# Patient Record
Sex: MALE | Race: WHITE | NOT HISPANIC OR LATINO | Employment: FULL TIME | URBAN - METROPOLITAN AREA
[De-identification: names, ages, dates, MRNs, and addresses within clinical notes are randomized per-mention and may not be internally consistent; named-entity substitution may affect disease eponyms.]

---

## 2023-08-30 ENCOUNTER — PATIENT ROUNDING (BHMG ONLY) (OUTPATIENT)
Dept: FAMILY MEDICINE CLINIC | Facility: CLINIC | Age: 48
End: 2023-08-30
Payer: COMMERCIAL

## 2023-08-30 ENCOUNTER — OFFICE VISIT (OUTPATIENT)
Dept: FAMILY MEDICINE CLINIC | Facility: CLINIC | Age: 48
End: 2023-08-30
Payer: COMMERCIAL

## 2023-08-30 VITALS
HEIGHT: 73 IN | SYSTOLIC BLOOD PRESSURE: 190 MMHG | OXYGEN SATURATION: 97 % | BODY MASS INDEX: 20.2 KG/M2 | DIASTOLIC BLOOD PRESSURE: 120 MMHG | TEMPERATURE: 97.3 F | WEIGHT: 152.4 LBS | HEART RATE: 69 BPM

## 2023-08-30 DIAGNOSIS — Z13.220 LIPID SCREENING: ICD-10-CM

## 2023-08-30 DIAGNOSIS — I10 PRIMARY HYPERTENSION: ICD-10-CM

## 2023-08-30 DIAGNOSIS — R00.0 TACHYCARDIA: ICD-10-CM

## 2023-08-30 DIAGNOSIS — Z83.3 FAMILY HISTORY OF DIABETES MELLITUS: ICD-10-CM

## 2023-08-30 DIAGNOSIS — K64.9 HEMORRHOIDS, UNSPECIFIED HEMORRHOID TYPE: ICD-10-CM

## 2023-08-30 DIAGNOSIS — K21.9 GASTROESOPHAGEAL REFLUX DISEASE WITHOUT ESOPHAGITIS: ICD-10-CM

## 2023-08-30 DIAGNOSIS — Z72.0 TOBACCO ABUSE: ICD-10-CM

## 2023-08-30 DIAGNOSIS — Z00.00 PREVENTATIVE HEALTH CARE: Primary | ICD-10-CM

## 2023-08-30 DIAGNOSIS — Z12.5 SCREENING PSA (PROSTATE SPECIFIC ANTIGEN): ICD-10-CM

## 2023-08-30 PROBLEM — K64.8 OTHER HEMORRHOIDS: Status: ACTIVE | Noted: 2023-08-30

## 2023-08-30 RX ORDER — ALBUTEROL SULFATE 90 UG/1
2 AEROSOL, METERED RESPIRATORY (INHALATION) EVERY 4 HOURS PRN
Qty: 18 G | Refills: 6 | Status: SHIPPED | OUTPATIENT
Start: 2023-08-30

## 2023-08-30 RX ORDER — PANTOPRAZOLE SODIUM 20 MG/1
20 TABLET, DELAYED RELEASE ORAL DAILY
Qty: 90 TABLET | Refills: 1 | Status: SHIPPED | OUTPATIENT
Start: 2023-08-30

## 2023-08-30 RX ORDER — ATENOLOL 50 MG/1
50 TABLET ORAL DAILY
Qty: 60 TABLET | Refills: 0 | Status: SHIPPED | OUTPATIENT
Start: 2023-08-30

## 2023-08-30 RX ORDER — DOCUSATE SODIUM 100 MG/1
100 CAPSULE, LIQUID FILLED ORAL 2 TIMES DAILY
Qty: 180 CAPSULE | Refills: 2 | Status: SHIPPED | OUTPATIENT
Start: 2023-08-30

## 2023-09-01 LAB
ALBUMIN SERPL-MCNC: 4.5 G/DL (ref 4.1–5.1)
ALBUMIN/GLOB SERPL: 2.3 {RATIO} (ref 1.2–2.2)
ALP SERPL-CCNC: 81 IU/L (ref 44–121)
ALT SERPL-CCNC: 12 IU/L (ref 0–44)
AST SERPL-CCNC: 19 IU/L (ref 0–40)
BASOPHILS # BLD AUTO: 0.1 X10E3/UL (ref 0–0.2)
BASOPHILS NFR BLD AUTO: 1 %
BILIRUB SERPL-MCNC: 0.7 MG/DL (ref 0–1.2)
BUN SERPL-MCNC: 11 MG/DL (ref 6–24)
BUN/CREAT SERPL: 11 (ref 9–20)
CALCIUM SERPL-MCNC: 9.7 MG/DL (ref 8.7–10.2)
CHLORIDE SERPL-SCNC: 101 MMOL/L (ref 96–106)
CHOLEST SERPL-MCNC: 141 MG/DL (ref 100–199)
CO2 SERPL-SCNC: 25 MMOL/L (ref 20–29)
CREAT SERPL-MCNC: 0.99 MG/DL (ref 0.76–1.27)
EGFRCR SERPLBLD CKD-EPI 2021: 93 ML/MIN/1.73
EOSINOPHIL # BLD AUTO: 0 X10E3/UL (ref 0–0.4)
EOSINOPHIL NFR BLD AUTO: 0 %
ERYTHROCYTE [DISTWIDTH] IN BLOOD BY AUTOMATED COUNT: 12.1 % (ref 11.6–15.4)
GLOBULIN SER CALC-MCNC: 2 G/DL (ref 1.5–4.5)
GLUCOSE SERPL-MCNC: 96 MG/DL (ref 70–99)
HBA1C MFR BLD: 5.3 % (ref 4.8–5.6)
HCT VFR BLD AUTO: 47.4 % (ref 37.5–51)
HDLC SERPL-MCNC: 60 MG/DL
HGB BLD-MCNC: 16.5 G/DL (ref 13–17.7)
IMM GRANULOCYTES # BLD AUTO: 0.1 X10E3/UL (ref 0–0.1)
IMM GRANULOCYTES NFR BLD AUTO: 1 %
LDLC SERPL CALC-MCNC: 68 MG/DL (ref 0–99)
LDLC/HDLC SERPL: 1.1 RATIO (ref 0–3.6)
LYMPHOCYTES # BLD AUTO: 1.6 X10E3/UL (ref 0.7–3.1)
LYMPHOCYTES NFR BLD AUTO: 13 %
MCH RBC QN AUTO: 32.3 PG (ref 26.6–33)
MCHC RBC AUTO-ENTMCNC: 34.8 G/DL (ref 31.5–35.7)
MCV RBC AUTO: 93 FL (ref 79–97)
MONOCYTES # BLD AUTO: 0.7 X10E3/UL (ref 0.1–0.9)
MONOCYTES NFR BLD AUTO: 6 %
NEUTROPHILS # BLD AUTO: 9.6 X10E3/UL (ref 1.4–7)
NEUTROPHILS NFR BLD AUTO: 79 %
PLATELET # BLD AUTO: 289 X10E3/UL (ref 150–450)
POTASSIUM SERPL-SCNC: 4.8 MMOL/L (ref 3.5–5.2)
PROT SERPL-MCNC: 6.5 G/DL (ref 6–8.5)
PSA SERPL-MCNC: 1.1 NG/ML (ref 0–4)
RBC # BLD AUTO: 5.11 X10E6/UL (ref 4.14–5.8)
SODIUM SERPL-SCNC: 142 MMOL/L (ref 134–144)
T3 SERPL-MCNC: 129 NG/DL (ref 71–180)
T4 FREE SERPL-MCNC: 1.28 NG/DL (ref 0.82–1.77)
TRIGL SERPL-MCNC: 60 MG/DL (ref 0–149)
TSH SERPL DL<=0.005 MIU/L-ACNC: 2.5 UIU/ML (ref 0.45–4.5)
VLDLC SERPL CALC-MCNC: 13 MG/DL (ref 5–40)
WBC # BLD AUTO: 12.1 X10E3/UL (ref 3.4–10.8)

## 2023-09-06 ENCOUNTER — TELEPHONE (OUTPATIENT)
Dept: FAMILY MEDICINE CLINIC | Facility: CLINIC | Age: 48
End: 2023-09-06
Payer: COMMERCIAL

## 2023-09-08 ENCOUNTER — OFFICE VISIT (OUTPATIENT)
Dept: SURGERY | Facility: CLINIC | Age: 48
End: 2023-09-08
Payer: COMMERCIAL

## 2023-09-08 VITALS
RESPIRATION RATE: 18 BRPM | HEART RATE: 58 BPM | HEIGHT: 73 IN | OXYGEN SATURATION: 99 % | SYSTOLIC BLOOD PRESSURE: 149 MMHG | DIASTOLIC BLOOD PRESSURE: 93 MMHG | WEIGHT: 156.4 LBS | TEMPERATURE: 97.8 F | BODY MASS INDEX: 20.73 KG/M2

## 2023-09-08 DIAGNOSIS — Z12.11 SCREENING FOR MALIGNANT NEOPLASM OF COLON: ICD-10-CM

## 2023-09-08 DIAGNOSIS — K64.1 GRADE II HEMORRHOIDS: Primary | ICD-10-CM

## 2023-09-08 PROCEDURE — 99203 OFFICE O/P NEW LOW 30 MIN: CPT | Performed by: SURGERY

## 2023-09-08 RX ORDER — HYDROCORTISONE ACETATE 25 MG/1
25 SUPPOSITORY RECTAL EVERY 12 HOURS
Qty: 14 SUPPOSITORY | Refills: 0 | Status: SHIPPED | OUTPATIENT
Start: 2023-09-08 | End: 2023-09-15

## 2023-10-30 ENCOUNTER — OFFICE VISIT (OUTPATIENT)
Dept: FAMILY MEDICINE CLINIC | Facility: CLINIC | Age: 48
End: 2023-10-30
Payer: COMMERCIAL

## 2023-10-30 VITALS
BODY MASS INDEX: 20.94 KG/M2 | TEMPERATURE: 97.3 F | OXYGEN SATURATION: 96 % | WEIGHT: 158 LBS | SYSTOLIC BLOOD PRESSURE: 122 MMHG | HEART RATE: 74 BPM | HEIGHT: 73 IN | DIASTOLIC BLOOD PRESSURE: 70 MMHG

## 2023-10-30 DIAGNOSIS — I10 PRIMARY HYPERTENSION: Primary | ICD-10-CM

## 2023-10-30 DIAGNOSIS — Z72.0 TOBACCO ABUSE: ICD-10-CM

## 2023-10-30 DIAGNOSIS — G47.09 OTHER INSOMNIA: ICD-10-CM

## 2023-10-30 DIAGNOSIS — F15.10 CAFFEINE ABUSE: ICD-10-CM

## 2023-10-30 DIAGNOSIS — R63.6 PATIENT UNDERWEIGHT: ICD-10-CM

## 2023-10-30 DIAGNOSIS — K64.8 OTHER HEMORRHOIDS: ICD-10-CM

## 2023-10-30 DIAGNOSIS — K21.9 GASTROESOPHAGEAL REFLUX DISEASE WITHOUT ESOPHAGITIS: ICD-10-CM

## 2023-10-30 DIAGNOSIS — R07.89 OTHER CHEST PAIN: ICD-10-CM

## 2023-10-30 RX ORDER — TRIAMCINOLONE ACETONIDE 55 UG/1
2 SPRAY, METERED NASAL DAILY
Qty: 16.9 ML | Refills: 6 | Status: SHIPPED | OUTPATIENT
Start: 2023-10-30

## 2023-10-30 RX ORDER — PANTOPRAZOLE SODIUM 20 MG/1
20 TABLET, DELAYED RELEASE ORAL DAILY
Qty: 90 TABLET | Refills: 1 | Status: SHIPPED | OUTPATIENT
Start: 2023-10-30

## 2023-10-30 RX ORDER — ALBUTEROL SULFATE 90 UG/1
2 AEROSOL, METERED RESPIRATORY (INHALATION) EVERY 4 HOURS PRN
Qty: 18 G | Refills: 6 | Status: SHIPPED | OUTPATIENT
Start: 2023-10-30

## 2023-10-30 RX ORDER — HYDROXYZINE 50 MG/1
50 TABLET, FILM COATED ORAL 3 TIMES DAILY PRN
Qty: 14 TABLET | Refills: 0 | Status: SHIPPED | OUTPATIENT
Start: 2023-10-30

## 2023-10-30 RX ORDER — CETIRIZINE HYDROCHLORIDE 10 MG/1
10 TABLET ORAL DAILY
Qty: 30 TABLET | Refills: 2 | Status: SHIPPED | OUTPATIENT
Start: 2023-10-30

## 2023-10-30 RX ORDER — ATENOLOL 50 MG/1
50 TABLET ORAL DAILY
Qty: 180 TABLET | Refills: 1 | Status: SHIPPED | OUTPATIENT
Start: 2023-10-30

## 2023-11-27 ENCOUNTER — OFFICE VISIT (OUTPATIENT)
Dept: CARDIOLOGY | Facility: CLINIC | Age: 48
End: 2023-11-27
Payer: COMMERCIAL

## 2023-11-27 VITALS
HEART RATE: 59 BPM | OXYGEN SATURATION: 97 % | SYSTOLIC BLOOD PRESSURE: 126 MMHG | HEIGHT: 73 IN | WEIGHT: 161 LBS | DIASTOLIC BLOOD PRESSURE: 70 MMHG | BODY MASS INDEX: 21.34 KG/M2 | RESPIRATION RATE: 18 BRPM

## 2023-11-27 DIAGNOSIS — I10 PRIMARY HYPERTENSION: ICD-10-CM

## 2023-11-27 DIAGNOSIS — K21.9 GASTROESOPHAGEAL REFLUX DISEASE WITHOUT ESOPHAGITIS: ICD-10-CM

## 2023-11-27 DIAGNOSIS — R06.02 SHORTNESS OF BREATH: ICD-10-CM

## 2023-11-27 DIAGNOSIS — R07.2 PRECORDIAL CHEST PAIN: Primary | ICD-10-CM

## 2023-11-27 PROCEDURE — 99204 OFFICE O/P NEW MOD 45 MIN: CPT | Performed by: INTERNAL MEDICINE

## 2023-11-27 RX ORDER — ASPIRIN 81 MG/1
81 TABLET ORAL DAILY
Qty: 100 TABLET | Refills: 1 | Status: SHIPPED | OUTPATIENT
Start: 2023-11-27

## 2023-11-27 RX ORDER — LOSARTAN POTASSIUM 25 MG/1
25 TABLET ORAL DAILY
Qty: 90 TABLET | Refills: 1 | Status: SHIPPED | OUTPATIENT
Start: 2023-11-27

## 2023-11-27 RX ORDER — HYDROXYZINE 50 MG/1
50 TABLET, FILM COATED ORAL 3 TIMES DAILY PRN
Qty: 90 TABLET | Refills: 1 | Status: SHIPPED | OUTPATIENT
Start: 2023-11-27

## 2023-11-29 ENCOUNTER — TELEPHONE (OUTPATIENT)
Dept: CARDIOLOGY | Facility: CLINIC | Age: 48
End: 2023-11-29
Payer: COMMERCIAL

## 2024-01-08 ENCOUNTER — OFFICE VISIT (OUTPATIENT)
Dept: FAMILY MEDICINE CLINIC | Facility: CLINIC | Age: 49
End: 2024-01-08
Payer: COMMERCIAL

## 2024-01-08 VITALS
SYSTOLIC BLOOD PRESSURE: 156 MMHG | TEMPERATURE: 97.5 F | OXYGEN SATURATION: 95 % | HEART RATE: 83 BPM | HEIGHT: 73 IN | BODY MASS INDEX: 20.81 KG/M2 | DIASTOLIC BLOOD PRESSURE: 98 MMHG | WEIGHT: 157 LBS

## 2024-01-08 DIAGNOSIS — Z00.00 PREVENTATIVE HEALTH CARE: ICD-10-CM

## 2024-01-08 DIAGNOSIS — M77.8 TENDINITIS OF BOTH FOREARMS: ICD-10-CM

## 2024-01-08 DIAGNOSIS — K21.9 GASTROESOPHAGEAL REFLUX DISEASE WITHOUT ESOPHAGITIS: ICD-10-CM

## 2024-01-08 DIAGNOSIS — D72.829 LEUKOCYTOSIS, UNSPECIFIED TYPE: Primary | ICD-10-CM

## 2024-01-08 DIAGNOSIS — R63.6 PATIENT UNDERWEIGHT: ICD-10-CM

## 2024-01-08 DIAGNOSIS — F15.10 CAFFEINE ABUSE: ICD-10-CM

## 2024-01-08 DIAGNOSIS — Z72.0 TOBACCO ABUSE: ICD-10-CM

## 2024-01-08 PROCEDURE — 99214 OFFICE O/P EST MOD 30 MIN: CPT | Performed by: NURSE PRACTITIONER

## 2024-01-08 RX ORDER — SUCRALFATE 1 G/1
TABLET ORAL
Qty: 90 TABLET | Refills: 1 | Status: SHIPPED | OUTPATIENT
Start: 2024-01-08

## 2024-01-08 RX ORDER — HYDROCORTISONE ACETATE 25 MG/1
25 SUPPOSITORY RECTAL 2 TIMES DAILY
Qty: 24 EACH | Refills: 6 | Status: SHIPPED | OUTPATIENT
Start: 2024-01-08

## 2024-01-09 LAB
ALBUMIN SERPL-MCNC: 4.3 G/DL (ref 4.1–5.1)
ALBUMIN/GLOB SERPL: 1.8 {RATIO} (ref 1.2–2.2)
ALP SERPL-CCNC: 99 IU/L (ref 44–121)
ALT SERPL-CCNC: 13 IU/L (ref 0–44)
AST SERPL-CCNC: 16 IU/L (ref 0–40)
BASOPHILS # BLD AUTO: 0.1 X10E3/UL (ref 0–0.2)
BASOPHILS NFR BLD AUTO: 1 %
BILIRUB SERPL-MCNC: 0.4 MG/DL (ref 0–1.2)
BUN SERPL-MCNC: 12 MG/DL (ref 6–24)
BUN/CREAT SERPL: 13 (ref 9–20)
CALCIUM SERPL-MCNC: 9.9 MG/DL (ref 8.7–10.2)
CHLORIDE SERPL-SCNC: 103 MMOL/L (ref 96–106)
CO2 SERPL-SCNC: 25 MMOL/L (ref 20–29)
CREAT SERPL-MCNC: 0.89 MG/DL (ref 0.76–1.27)
EGFRCR SERPLBLD CKD-EPI 2021: 105 ML/MIN/1.73
EOSINOPHIL # BLD AUTO: 0.1 X10E3/UL (ref 0–0.4)
EOSINOPHIL NFR BLD AUTO: 0 %
ERYTHROCYTE [DISTWIDTH] IN BLOOD BY AUTOMATED COUNT: 12.2 % (ref 11.6–15.4)
GLOBULIN SER CALC-MCNC: 2.4 G/DL (ref 1.5–4.5)
GLUCOSE SERPL-MCNC: 93 MG/DL (ref 70–99)
HCT VFR BLD AUTO: 49.5 % (ref 37.5–51)
HGB BLD-MCNC: 17 G/DL (ref 13–17.7)
IMM GRANULOCYTES # BLD AUTO: 0.1 X10E3/UL (ref 0–0.1)
IMM GRANULOCYTES NFR BLD AUTO: 1 %
LYMPHOCYTES # BLD AUTO: 1.5 X10E3/UL (ref 0.7–3.1)
LYMPHOCYTES NFR BLD AUTO: 11 %
MCH RBC QN AUTO: 31.2 PG (ref 26.6–33)
MCHC RBC AUTO-ENTMCNC: 34.3 G/DL (ref 31.5–35.7)
MCV RBC AUTO: 91 FL (ref 79–97)
MONOCYTES # BLD AUTO: 0.5 X10E3/UL (ref 0.1–0.9)
MONOCYTES NFR BLD AUTO: 4 %
NEUTROPHILS # BLD AUTO: 11.1 X10E3/UL (ref 1.4–7)
NEUTROPHILS NFR BLD AUTO: 83 %
PLATELET # BLD AUTO: 318 X10E3/UL (ref 150–450)
POTASSIUM SERPL-SCNC: 4.7 MMOL/L (ref 3.5–5.2)
PROT SERPL-MCNC: 6.7 G/DL (ref 6–8.5)
RBC # BLD AUTO: 5.45 X10E6/UL (ref 4.14–5.8)
SODIUM SERPL-SCNC: 141 MMOL/L (ref 134–144)
WBC # BLD AUTO: 13.4 X10E3/UL (ref 3.4–10.8)

## 2024-01-23 RX ORDER — CETIRIZINE HYDROCHLORIDE 10 MG/1
10 TABLET ORAL DAILY
Qty: 30 TABLET | Refills: 2 | Status: SHIPPED | OUTPATIENT
Start: 2024-01-23

## 2024-02-17 RX ORDER — HYDROXYZINE 50 MG/1
TABLET, FILM COATED ORAL
Qty: 90 TABLET | Refills: 1 | Status: SHIPPED | OUTPATIENT
Start: 2024-02-17

## 2024-06-22 RX ORDER — PANTOPRAZOLE SODIUM 20 MG/1
20 TABLET, DELAYED RELEASE ORAL DAILY
Qty: 90 TABLET | Refills: 1 | Status: SHIPPED | OUTPATIENT
Start: 2024-06-22

## 2024-06-22 RX ORDER — CETIRIZINE HYDROCHLORIDE 10 MG/1
10 TABLET ORAL DAILY
Qty: 30 TABLET | Refills: 2 | Status: SHIPPED | OUTPATIENT
Start: 2024-06-22

## 2024-09-18 RX ORDER — PANTOPRAZOLE SODIUM 20 MG/1
20 TABLET, DELAYED RELEASE ORAL DAILY
Qty: 90 TABLET | Refills: 1 | Status: CANCELLED | OUTPATIENT
Start: 2024-09-18

## 2024-09-23 RX ORDER — PANTOPRAZOLE SODIUM 20 MG/1
20 TABLET, DELAYED RELEASE ORAL DAILY
Qty: 90 TABLET | Refills: 1 | Status: CANCELLED | OUTPATIENT
Start: 2024-09-23

## 2024-11-27 RX ORDER — CETIRIZINE HYDROCHLORIDE 10 MG/1
10 TABLET ORAL DAILY
Qty: 30 TABLET | Refills: 2 | Status: SHIPPED | OUTPATIENT
Start: 2024-11-27

## 2024-12-17 RX ORDER — ATENOLOL 50 MG/1
50 TABLET ORAL DAILY
Qty: 180 TABLET | Refills: 1 | Status: CANCELLED | OUTPATIENT
Start: 2024-12-17

## 2024-12-18 ENCOUNTER — OFFICE VISIT (OUTPATIENT)
Dept: FAMILY MEDICINE CLINIC | Facility: CLINIC | Age: 49
End: 2024-12-18
Payer: COMMERCIAL

## 2024-12-18 VITALS
BODY MASS INDEX: 22.13 KG/M2 | TEMPERATURE: 97.3 F | WEIGHT: 167 LBS | HEIGHT: 73 IN | OXYGEN SATURATION: 98 % | SYSTOLIC BLOOD PRESSURE: 116 MMHG | HEART RATE: 78 BPM | DIASTOLIC BLOOD PRESSURE: 70 MMHG

## 2024-12-18 DIAGNOSIS — Z12.5 SCREENING PSA (PROSTATE SPECIFIC ANTIGEN): ICD-10-CM

## 2024-12-18 DIAGNOSIS — Z83.3 FAMILY HISTORY OF DIABETES MELLITUS: ICD-10-CM

## 2024-12-18 DIAGNOSIS — Z00.00 PREVENTATIVE HEALTH CARE: Primary | ICD-10-CM

## 2024-12-18 DIAGNOSIS — G47.09 OTHER INSOMNIA: ICD-10-CM

## 2024-12-18 DIAGNOSIS — Z13.220 LIPID SCREENING: ICD-10-CM

## 2024-12-18 DIAGNOSIS — R07.89 OTHER CHEST PAIN: ICD-10-CM

## 2024-12-18 DIAGNOSIS — Z63.8: ICD-10-CM

## 2024-12-18 DIAGNOSIS — Z72.0 TOBACCO ABUSE: ICD-10-CM

## 2024-12-18 DIAGNOSIS — Z12.11 COLON CANCER SCREENING: ICD-10-CM

## 2024-12-18 DIAGNOSIS — R07.9 CHEST PAIN, UNSPECIFIED TYPE: ICD-10-CM

## 2024-12-18 DIAGNOSIS — R53.83 OTHER FATIGUE: ICD-10-CM

## 2024-12-18 DIAGNOSIS — K21.9 GASTROESOPHAGEAL REFLUX DISEASE WITHOUT ESOPHAGITIS: ICD-10-CM

## 2024-12-18 PROCEDURE — 93000 ELECTROCARDIOGRAM COMPLETE: CPT | Performed by: NURSE PRACTITIONER

## 2024-12-18 PROCEDURE — 99214 OFFICE O/P EST MOD 30 MIN: CPT | Performed by: NURSE PRACTITIONER

## 2024-12-18 RX ORDER — ATENOLOL 50 MG/1
50 TABLET ORAL DAILY
Qty: 90 TABLET | Refills: 1 | Status: SHIPPED | OUTPATIENT
Start: 2024-12-18

## 2024-12-18 RX ORDER — PANTOPRAZOLE SODIUM 20 MG/1
20 TABLET, DELAYED RELEASE ORAL DAILY
Qty: 180 TABLET | Refills: 1 | Status: SHIPPED | OUTPATIENT
Start: 2024-12-18

## 2024-12-18 RX ORDER — NITROGLYCERIN 0.4 MG/1
0.4 TABLET SUBLINGUAL
Qty: 30 TABLET | Refills: 12 | Status: SHIPPED | OUTPATIENT
Start: 2024-12-18

## 2024-12-18 RX ORDER — SUCRALFATE 1 G/1
TABLET ORAL
Qty: 120 TABLET | Refills: 1 | Status: SHIPPED | OUTPATIENT
Start: 2024-12-18

## 2024-12-18 RX ORDER — ALBUTEROL SULFATE 90 UG/1
2 INHALANT RESPIRATORY (INHALATION) EVERY 4 HOURS PRN
Qty: 18 G | Refills: 6 | Status: SHIPPED | OUTPATIENT
Start: 2024-12-18

## 2024-12-18 RX ORDER — HYDROXYZINE HYDROCHLORIDE 50 MG/1
50 TABLET, FILM COATED ORAL 3 TIMES DAILY PRN
Qty: 14 TABLET | Refills: 0 | Status: SHIPPED | OUTPATIENT
Start: 2024-12-18

## 2024-12-18 SDOH — SOCIAL STABILITY - SOCIAL INSECURITY: OTHER SPECIFIED PROBLEMS RELATED TO PRIMARY SUPPORT GROUP: Z63.8

## 2024-12-18 NOTE — PATIENT INSTRUCTIONS
Fasting blood work  EKG  Cardiology referral  Nitroglycerin sublingual as needed for chest pain monitor blood pressure  Monitor blood pressure with parameters given to know whether to use atenolol not  General Surgery referral for colonoscopy  Go back on Carafate 1-4 times a day and Protonix twice a day till stomach issues improve  Hydroxyzine 50 mg 1/2 to 2 tablets 30 minutes before bed trial  Stop smoking  Follow-up 3 months

## 2024-12-18 NOTE — PROGRESS NOTES
Tino Mcgrath is a 49 y.o. male.     History of Present Illness  49-year-old white male smoker with history of hemorrhoids, hypertension who comes in today for follow-up visit.    Blood pressure 116/70 heart rate 78 he denies any dyspnea, tachycardia or dizziness.  Patient is under a lot of stress right now has been complaining of chest pain with diaphoresis for several months now.  He has not had any of his hypertension medications either due to not being in since January but his blood pressure is pretty good today.  Will get an EKG and troponin and give him parameters to monitor his blood pressure at home in case we need to go back on his atenolol.  I am setting him up with cardiology for stress test.  Does have a family history of CAD.  Fortunately EKG did not show anything serious but there is a right bundle branch block    Patient recently lost his wife's been pretty stressed out.  He has not been here since January so he has been out of his medications for acid reflux.  He has not been sleeping very well either.  Will place him back on his stomach medications and give him a trial of hydroxyzine to try for sleep.  He denies wanting anything for depression.  We did review once again things to avoid with reflux as far as foods and drink    Weight is 167 with a BMI 22.0.  Has not been vaccinated for COVID or influenza.  He is up-to-date on eye exams be doing his PSA with blood work today and I will set him up for his for screening colonoscopy in the spring once we get the cardiac issues out of the way          Fasting blood work  EKG  Cardiology referral  Nitroglycerin sublingual as needed for chest pain monitor blood pressure  Monitor blood pressure with parameters given to know whether to use atenolol not  General Surgery referral for colonoscopy  Go back on Carafate 1-4 times a day and Protonix twice a day till stomach issues improve  Hydroxyzine 50 mg 1/2 to 2 tablets 30 minutes before bed trial  Stop  "smoking  Follow-up 3 months       The following portions of the patient's history were reviewed and updated as appropriate: allergies, current medications, past family history, past medical history, past social history, past surgical history, and problem list.    Vitals:    12/18/24 1007   BP: 116/70   BP Location: Right arm   Patient Position: Sitting   Cuff Size: Adult   Pulse: 78   Temp: 97.3 °F (36.3 °C)   TempSrc: Infrared   SpO2: 98%   Weight: 75.8 kg (167 lb)   Height: 185.4 cm (72.99\")       Past Medical History:   Diagnosis Date    GERD (gastroesophageal reflux disease)     Hypertension      Past Surgical History:   Procedure Laterality Date    CARPAL TUNNEL RELEASE Right     ELBOW PROCEDURE Right      Family History   Problem Relation Age of Onset    Heart attack Father     Heart disease Father     Hypertension Father      Immunization History   Administered Date(s) Administered    Hepatitis A 08/01/2019, 02/10/2020    Td (TDVAX) 08/01/2019       Office Visit on 01/08/2024   Component Date Value Ref Range Status    Glucose 01/08/2024 93  70 - 99 mg/dL Final    BUN 01/08/2024 12  6 - 24 mg/dL Final    Creatinine 01/08/2024 0.89  0.76 - 1.27 mg/dL Final    EGFR Result 01/08/2024 105  >59 mL/min/1.73 Final    BUN/Creatinine Ratio 01/08/2024 13  9 - 20 Final    Sodium 01/08/2024 141  134 - 144 mmol/L Final    Potassium 01/08/2024 4.7  3.5 - 5.2 mmol/L Final    Chloride 01/08/2024 103  96 - 106 mmol/L Final    Total CO2 01/08/2024 25  20 - 29 mmol/L Final    Calcium 01/08/2024 9.9  8.7 - 10.2 mg/dL Final    Total Protein 01/08/2024 6.7  6.0 - 8.5 g/dL Final    Albumin 01/08/2024 4.3  4.1 - 5.1 g/dL Final    Globulin 01/08/2024 2.4  1.5 - 4.5 g/dL Final    A/G Ratio 01/08/2024 1.8  1.2 - 2.2 Final    Total Bilirubin 01/08/2024 0.4  0.0 - 1.2 mg/dL Final    Alkaline Phosphatase 01/08/2024 99  44 - 121 IU/L Final    AST (SGOT) 01/08/2024 16  0 - 40 IU/L Final    ALT (SGPT) 01/08/2024 13  0 - 44 IU/L Final    WBC " 01/08/2024 13.4 (H)  3.4 - 10.8 x10E3/uL Final    RBC 01/08/2024 5.45  4.14 - 5.80 x10E6/uL Final    Hemoglobin 01/08/2024 17.0  13.0 - 17.7 g/dL Final    Hematocrit 01/08/2024 49.5  37.5 - 51.0 % Final    MCV 01/08/2024 91  79 - 97 fL Final    MCH 01/08/2024 31.2  26.6 - 33.0 pg Final    MCHC 01/08/2024 34.3  31.5 - 35.7 g/dL Final    RDW 01/08/2024 12.2  11.6 - 15.4 % Final    Platelets 01/08/2024 318  150 - 450 x10E3/uL Final    Neutrophil Rel % 01/08/2024 83  Not Estab. % Final    Lymphocyte Rel % 01/08/2024 11  Not Estab. % Final    Monocyte Rel % 01/08/2024 4  Not Estab. % Final    Eosinophil Rel % 01/08/2024 0  Not Estab. % Final    Basophil Rel % 01/08/2024 1  Not Estab. % Final    Neutrophils Absolute 01/08/2024 11.1 (H)  1.4 - 7.0 x10E3/uL Final    Lymphocytes Absolute 01/08/2024 1.5  0.7 - 3.1 x10E3/uL Final    Monocytes Absolute 01/08/2024 0.5  0.1 - 0.9 x10E3/uL Final    Eosinophils Absolute 01/08/2024 0.1  0.0 - 0.4 x10E3/uL Final    Basophils Absolute 01/08/2024 0.1  0.0 - 0.2 x10E3/uL Final    Immature Granulocyte Rel % 01/08/2024 1  Not Estab. % Final    Immature Grans Absolute 01/08/2024 0.1  0.0 - 0.1 x10E3/uL Final         Review of Systems   Constitutional: Negative.    HENT: Negative.     Respiratory: Negative.     Cardiovascular: Negative.  Positive for chest pain.   Gastrointestinal: Negative.  Positive for GERD.   Genitourinary: Negative.    Musculoskeletal: Negative.    Skin: Negative.    Neurological: Negative.    Psychiatric/Behavioral: Negative.  Positive for sleep disturbance and depressed mood.        Objective   Physical Exam  Constitutional:       Appearance: Normal appearance.   HENT:      Head: Normocephalic.   Cardiovascular:      Rate and Rhythm: Normal rate and regular rhythm.      Pulses: Normal pulses.      Heart sounds: Normal heart sounds.   Pulmonary:      Effort: Pulmonary effort is normal.      Breath sounds: Normal breath sounds.   Abdominal:      General: Bowel sounds  are normal.   Musculoskeletal:         General: Normal range of motion.   Skin:     General: Skin is warm.   Neurological:      General: No focal deficit present.      Mental Status: He is alert and oriented to person, place, and time.   Psychiatric:         Mood and Affect: Mood normal.         Behavior: Behavior normal.           ECG 12 Lead    Date/Time: 12/18/2024 11:15 AM  Performed by: Tessa Tate APRN    Authorized by: Tessa Tate APRN  Rhythm: sinus rhythm  Rate: normal  Conduction: right bundle branch block  ST Segments: ST segments normal  T Waves: T waves normal  QRS axis: normal    Clinical impression: abnormal EKG          Assessment & Plan   Diagnoses and all orders for this visit:    1. Preventative health care (Primary)  -     CBC & Differential; Future    2. Family history of diabetes mellitus  -     Comprehensive Metabolic Panel; Future  -     Hemoglobin A1c; Future    3. Lipid screening  -     Lipid Panel With LDL / HDL Ratio; Future    4. Other fatigue  -     TSH+Free T4; Future  -     T3; Future    5. Screening PSA (prostate specific antigen)  -     PSA Screen; Future    6. Chest pain, unspecified type  -     Ambulatory Referral to Cardiology  -     ECG 12 Lead  -     Troponin T    7. Colon cancer screening  -     Ambulatory Referral to General Surgery    Other orders  -     hydrOXYzine (ATARAX) 50 MG tablet; Take 1 tablet by mouth 3 (Three) Times a Day As Needed (insomnia).  Dispense: 14 tablet; Refill: 0  -     sucralfate (Carafate) 1 g tablet; 1tab 1-4 x a day as needed for reflux  Dispense: 120 tablet; Refill: 1  -     pantoprazole (PROTONIX) 20 MG EC tablet; Take 1 tablet by mouth Daily.  Dispense: 180 tablet; Refill: 1  -     atenolol (TENORMIN) 50 MG tablet; Take 1 tablet by mouth Daily.  Dispense: 90 tablet; Refill: 1  -     albuterol sulfate HFA (Ventolin HFA) 108 (90 Base) MCG/ACT inhaler; Inhale 2 puffs Every 4 (Four) Hours As Needed for Wheezing.  Dispense: 18 g; Refill:  6           Current Outpatient Medications:     albuterol sulfate HFA (Ventolin HFA) 108 (90 Base) MCG/ACT inhaler, Inhale 2 puffs Every 4 (Four) Hours As Needed for Wheezing., Disp: 18 g, Rfl: 6    aspirin 81 MG EC tablet, Take 1 tablet by mouth Daily., Disp: 100 tablet, Rfl: 1    atenolol (TENORMIN) 50 MG tablet, Take 1 tablet by mouth Daily., Disp: 90 tablet, Rfl: 1    cetirizine (zyrTEC) 10 MG tablet, Take 1 tablet by mouth Daily., Disp: 30 tablet, Rfl: 2    docusate sodium (Colace) 100 MG capsule, Take 1 capsule by mouth 2 (Two) Times a Day., Disp: 180 capsule, Rfl: 2    hydrocortisone (ANUSOL-HC) 25 MG suppository, Insert 1 suppository into the rectum 2 (Two) Times a Day., Disp: 24 each, Rfl: 6    pantoprazole (PROTONIX) 20 MG EC tablet, Take 1 tablet by mouth Daily., Disp: 180 tablet, Rfl: 1    phenylephrine 0.25 % suppository, Insert 1 suppository into the rectum 2 (Two) Times a Day., Disp: 60 suppository, Rfl: 3    sucralfate (Carafate) 1 g tablet, 1tab 1-4 x a day as needed for reflux, Disp: 120 tablet, Rfl: 1    Triamcinolone Acetonide (Nasacort Allergy 24HR) 55 MCG/ACT nasal inhaler, 2 sprays into the nostril(s) as directed by provider Daily., Disp: 16.9 mL, Rfl: 6    hydrOXYzine (ATARAX) 50 MG tablet, Take 1 tablet by mouth 3 (Three) Times a Day As Needed (insomnia)., Disp: 14 tablet, Rfl: 0           Tessa Tate, MARGARITA 12/18/2024 10:49 EST  This note has been electronically signed

## 2024-12-21 LAB
ALBUMIN SERPL-MCNC: 4.2 G/DL (ref 4.1–5.1)
ALP SERPL-CCNC: 72 IU/L (ref 44–121)
ALT SERPL-CCNC: 16 IU/L (ref 0–44)
AST SERPL-CCNC: 21 IU/L (ref 0–40)
BASOPHILS # BLD AUTO: 0.1 X10E3/UL (ref 0–0.2)
BASOPHILS NFR BLD AUTO: 1 %
BILIRUB SERPL-MCNC: 0.5 MG/DL (ref 0–1.2)
BUN SERPL-MCNC: 11 MG/DL (ref 6–24)
BUN/CREAT SERPL: 11 (ref 9–20)
CALCIUM SERPL-MCNC: 9.7 MG/DL (ref 8.7–10.2)
CHLORIDE SERPL-SCNC: 103 MMOL/L (ref 96–106)
CHOLEST SERPL-MCNC: 153 MG/DL (ref 100–199)
CO2 SERPL-SCNC: 26 MMOL/L (ref 20–29)
CREAT SERPL-MCNC: 0.98 MG/DL (ref 0.76–1.27)
EGFRCR SERPLBLD CKD-EPI 2021: 94 ML/MIN/1.73
EOSINOPHIL # BLD AUTO: 0.1 X10E3/UL (ref 0–0.4)
EOSINOPHIL NFR BLD AUTO: 1 %
ERYTHROCYTE [DISTWIDTH] IN BLOOD BY AUTOMATED COUNT: 12.4 % (ref 11.6–15.4)
GLOBULIN SER CALC-MCNC: 2.1 G/DL (ref 1.5–4.5)
GLUCOSE SERPL-MCNC: 87 MG/DL (ref 70–99)
HBA1C MFR BLD: 5.5 % (ref 4.8–5.6)
HCT VFR BLD AUTO: 50.6 % (ref 37.5–51)
HDLC SERPL-MCNC: 50 MG/DL
HGB BLD-MCNC: 16.9 G/DL (ref 13–17.7)
IMM GRANULOCYTES # BLD AUTO: 0.1 X10E3/UL (ref 0–0.1)
IMM GRANULOCYTES NFR BLD AUTO: 1 %
LDLC SERPL CALC-MCNC: 86 MG/DL (ref 0–99)
LDLC/HDLC SERPL: 1.7 RATIO (ref 0–3.6)
LYMPHOCYTES # BLD AUTO: 1.6 X10E3/UL (ref 0.7–3.1)
LYMPHOCYTES NFR BLD AUTO: 19 %
MCH RBC QN AUTO: 32 PG (ref 26.6–33)
MCHC RBC AUTO-ENTMCNC: 33.4 G/DL (ref 31.5–35.7)
MCV RBC AUTO: 96 FL (ref 79–97)
MONOCYTES # BLD AUTO: 0.6 X10E3/UL (ref 0.1–0.9)
MONOCYTES NFR BLD AUTO: 7 %
NEUTROPHILS # BLD AUTO: 5.9 X10E3/UL (ref 1.4–7)
NEUTROPHILS NFR BLD AUTO: 71 %
PLATELET # BLD AUTO: 290 X10E3/UL (ref 150–450)
POTASSIUM SERPL-SCNC: 5 MMOL/L (ref 3.5–5.2)
PROT SERPL-MCNC: 6.3 G/DL (ref 6–8.5)
PSA SERPL-MCNC: 1.3 NG/ML (ref 0–4)
RBC # BLD AUTO: 5.28 X10E6/UL (ref 4.14–5.8)
SODIUM SERPL-SCNC: 141 MMOL/L (ref 134–144)
T3 SERPL-MCNC: 110 NG/DL (ref 71–180)
T4 FREE SERPL-MCNC: 1.06 NG/DL (ref 0.82–1.77)
TRIGL SERPL-MCNC: 89 MG/DL (ref 0–149)
TSH SERPL DL<=0.005 MIU/L-ACNC: 2.54 UIU/ML (ref 0.45–4.5)
VLDLC SERPL CALC-MCNC: 17 MG/DL (ref 5–40)
WBC # BLD AUTO: 8.4 X10E3/UL (ref 3.4–10.8)

## 2024-12-23 LAB — TROPONIN T SERPL HS-MCNC: 12 NG/L (ref 0–22)

## 2025-01-21 RX ORDER — HYDROXYZINE HYDROCHLORIDE 50 MG/1
TABLET, FILM COATED ORAL
Qty: 60 TABLET | Refills: 2 | Status: SHIPPED | OUTPATIENT
Start: 2025-01-21

## 2025-03-17 RX ORDER — CETIRIZINE HYDROCHLORIDE 10 MG/1
TABLET ORAL
Qty: 30 TABLET | Refills: 2 | Status: SHIPPED | OUTPATIENT
Start: 2025-03-17

## 2025-03-18 ENCOUNTER — OFFICE VISIT (OUTPATIENT)
Dept: FAMILY MEDICINE CLINIC | Facility: CLINIC | Age: 50
End: 2025-03-18
Payer: COMMERCIAL

## 2025-03-18 VITALS
HEART RATE: 64 BPM | WEIGHT: 159 LBS | DIASTOLIC BLOOD PRESSURE: 94 MMHG | OXYGEN SATURATION: 98 % | BODY MASS INDEX: 21.07 KG/M2 | HEIGHT: 73 IN | TEMPERATURE: 97.8 F | SYSTOLIC BLOOD PRESSURE: 170 MMHG

## 2025-03-18 DIAGNOSIS — R07.89 OTHER CHEST PAIN: ICD-10-CM

## 2025-03-18 DIAGNOSIS — R63.6 PATIENT UNDERWEIGHT: ICD-10-CM

## 2025-03-18 DIAGNOSIS — G47.09 OTHER INSOMNIA: ICD-10-CM

## 2025-03-18 DIAGNOSIS — F15.10 CAFFEINE ABUSE: ICD-10-CM

## 2025-03-18 DIAGNOSIS — K21.9 GASTROESOPHAGEAL REFLUX DISEASE WITHOUT ESOPHAGITIS: ICD-10-CM

## 2025-03-18 DIAGNOSIS — Z12.2 SCREENING FOR LUNG CANCER: ICD-10-CM

## 2025-03-18 DIAGNOSIS — Z72.0 TOBACCO ABUSE: Primary | ICD-10-CM

## 2025-03-18 RX ORDER — SUCRALFATE 1 G/1
TABLET ORAL
Qty: 120 TABLET | Refills: 1 | Status: SHIPPED | OUTPATIENT
Start: 2025-03-18

## 2025-03-18 RX ORDER — PANTOPRAZOLE SODIUM 20 MG/1
20 TABLET, DELAYED RELEASE ORAL DAILY
Qty: 180 TABLET | Refills: 1 | Status: SHIPPED | OUTPATIENT
Start: 2025-03-18

## 2025-03-18 RX ORDER — ALBUTEROL SULFATE 90 UG/1
2 INHALANT RESPIRATORY (INHALATION) EVERY 4 HOURS PRN
Qty: 18 G | Refills: 6 | Status: SHIPPED | OUTPATIENT
Start: 2025-03-18

## 2025-03-18 RX ORDER — HYDROXYZINE HYDROCHLORIDE 50 MG/1
50 TABLET, FILM COATED ORAL NIGHTLY
Qty: 60 TABLET | Refills: 2 | Status: SHIPPED | OUTPATIENT
Start: 2025-03-18

## 2025-03-18 RX ORDER — ATENOLOL 50 MG/1
50 TABLET ORAL DAILY
Qty: 90 TABLET | Refills: 1 | Status: SHIPPED | OUTPATIENT
Start: 2025-03-18

## 2025-03-18 NOTE — PROGRESS NOTES
Tino Mcgrath is a 49 y.o. male.     History of Present Illness  49-year-old white male smoker with history of hemorrhoids, hypertension, insomnia and GERD who comes in today for follow-up visit    Blood pressure elevated today 170/94.  Patient still having intermittent chest pain but he has not used the nitroglycerin I gave him and he also has not been checking his blood pressure as of late.  He did take his atenolol today.  I did tell him to monitor his blood pressure and let me know if it remains elevated..  He does have an upcoming appointment with Dr. Tovar.  He had to cancel 2 previous appointments due to his wife's death.  He continues to smoke and do too much caffeine.    On last visit I placed him on Carafate and Protonix which has helped with his stomach.  He did have a normal EGD    Also placed him on hydroxyzine for sleep and he states that it helped a great deal with sleeping will reorder both of those for him.    Weight is down 8 pounds 159 with a BMI of 20.9.  He has not been vaccinated for COVID he did have an eye exam in February of last year that showed hypertensive but need to schedule another visit send is been over a year.  His PSA is due in December 2025 and his next colonoscopy is due in February 2028.  Patient is been a heavy smoker for over 30 years I am ordering CT chest low-dose          CT chest low-dose  Monitor blood pressure at home and let me know if it does not return to normal  Schedule eye exam  Keep appointment with Dr. Tovar  Try taking nitroglycerin with chest pain episodes  Follow-up 3 to 6 months                   The following portions of the patient's history were reviewed and updated as appropriate: allergies, current medications, past family history, past medical history, past social history, past surgical history, and problem list.    Vitals:    03/18/25 1016   BP: 170/94   BP Location: Left arm   Patient Position: Sitting   Cuff Size: Adult   Pulse: 64   Temp: 97.8 °F  "(36.6 °C)   TempSrc: Temporal   SpO2: 98%   Weight: 72.1 kg (159 lb)   Height: 185.4 cm (73\")       Past Medical History:   Diagnosis Date    GERD (gastroesophageal reflux disease)     Hypertension      Past Surgical History:   Procedure Laterality Date    CARPAL TUNNEL RELEASE Right     ELBOW PROCEDURE Right      Family History   Problem Relation Age of Onset    Heart attack Father     Heart disease Father     Hypertension Father     Arthritis Mother     Arthritis Maternal Grandfather     Diabetes Maternal Grandfather     Hyperlipidemia Maternal Grandfather     Hypertension Maternal Grandfather     Arthritis Maternal Grandmother     Diabetes Maternal Grandmother     Hyperlipidemia Maternal Grandmother     Hypertension Maternal Grandmother      Immunization History   Administered Date(s) Administered    Hepatitis A 08/01/2019, 02/10/2020    Td (TDVAX) 08/01/2019       Office Visit on 12/18/2024   Component Date Value Ref Range Status    Troponin T 12/20/2024 12  0 - 22 ng/L Final    Comment: In order to distinguish acute elevations of high sensitive  Troponin from other clinical conditions, the Universal  Definition of myocardial infarction stresses clinical  assessment and the need for serial measurements to observe  a rise and/or fall above the upper limit of the reference  interval.      PSA 12/20/2024 1.3  0.0 - 4.0 ng/mL Final    Comment: Roche ECLIA methodology.  According to the American [employer] Association, Serum PSA should  decrease and remain at undetectable levels after radical  prostatectomy. The AUA defines biochemical recurrence as an initial  PSA value 0.2 ng/mL or greater followed by a subsequent confirmatory  PSA value 0.2 ng/mL or greater.  Values obtained with different assay methods or kits cannot be used  interchangeably. Results cannot be interpreted as absolute evidence  of the presence or absence of malignant disease.      T3, Total 12/20/2024 110  71 - 180 ng/dL Final    TSH 12/20/2024 " 2.540  0.450 - 4.500 uIU/mL Final    Free T4 12/20/2024 1.06  0.82 - 1.77 ng/dL Final    Total Cholesterol 12/20/2024 153  100 - 199 mg/dL Final    Triglycerides 12/20/2024 89  0 - 149 mg/dL Final    HDL Cholesterol 12/20/2024 50  >39 mg/dL Final    VLDL Cholesterol Maciej 12/20/2024 17  5 - 40 mg/dL Final    LDL Chol Calc (NIH) 12/20/2024 86  0 - 99 mg/dL Final    LDL/HDL RATIO 12/20/2024 1.7  0.0 - 3.6 ratio Final    Comment:                                     LDL/HDL Ratio                                              Men  Women                                1/2 Avg.Risk  1.0    1.5                                    Avg.Risk  3.6    3.2                                 2X Avg.Risk  6.2    5.0                                 3X Avg.Risk  8.0    6.1      Hemoglobin A1C 12/20/2024 5.5  4.8 - 5.6 % Final    Comment:          Prediabetes: 5.7 - 6.4           Diabetes: >6.4           Glycemic control for adults with diabetes: <7.0      Glucose 12/20/2024 87  70 - 99 mg/dL Final    BUN 12/20/2024 11  6 - 24 mg/dL Final    Creatinine 12/20/2024 0.98  0.76 - 1.27 mg/dL Final    EGFR Result 12/20/2024 94  >59 mL/min/1.73 Final    BUN/Creatinine Ratio 12/20/2024 11  9 - 20 Final    Sodium 12/20/2024 141  134 - 144 mmol/L Final    Potassium 12/20/2024 5.0  3.5 - 5.2 mmol/L Final    Chloride 12/20/2024 103  96 - 106 mmol/L Final    Total CO2 12/20/2024 26  20 - 29 mmol/L Final    Calcium 12/20/2024 9.7  8.7 - 10.2 mg/dL Final    Total Protein 12/20/2024 6.3  6.0 - 8.5 g/dL Final    Albumin 12/20/2024 4.2  4.1 - 5.1 g/dL Final    Globulin 12/20/2024 2.1  1.5 - 4.5 g/dL Final    Total Bilirubin 12/20/2024 0.5  0.0 - 1.2 mg/dL Final    Alkaline Phosphatase 12/20/2024 72  44 - 121 IU/L Final    AST (SGOT) 12/20/2024 21  0 - 40 IU/L Final    ALT (SGPT) 12/20/2024 16  0 - 44 IU/L Final    WBC 12/20/2024 8.4  3.4 - 10.8 x10E3/uL Final    RBC 12/20/2024 5.28  4.14 - 5.80 x10E6/uL Final    Hemoglobin 12/20/2024 16.9  13.0 - 17.7 g/dL  Final    Hematocrit 12/20/2024 50.6  37.5 - 51.0 % Final    MCV 12/20/2024 96  79 - 97 fL Final    MCH 12/20/2024 32.0  26.6 - 33.0 pg Final    MCHC 12/20/2024 33.4  31.5 - 35.7 g/dL Final    RDW 12/20/2024 12.4  11.6 - 15.4 % Final    Platelets 12/20/2024 290  150 - 450 x10E3/uL Final    Neutrophil Rel % 12/20/2024 71  Not Estab. % Final    Lymphocyte Rel % 12/20/2024 19  Not Estab. % Final    Monocyte Rel % 12/20/2024 7  Not Estab. % Final    Eosinophil Rel % 12/20/2024 1  Not Estab. % Final    Basophil Rel % 12/20/2024 1  Not Estab. % Final    Neutrophils Absolute 12/20/2024 5.9  1.4 - 7.0 x10E3/uL Final    Lymphocytes Absolute 12/20/2024 1.6  0.7 - 3.1 x10E3/uL Final    Monocytes Absolute 12/20/2024 0.6  0.1 - 0.9 x10E3/uL Final    Eosinophils Absolute 12/20/2024 0.1  0.0 - 0.4 x10E3/uL Final    Basophils Absolute 12/20/2024 0.1  0.0 - 0.2 x10E3/uL Final    Immature Granulocyte Rel % 12/20/2024 1  Not Estab. % Final    Immature Grans Absolute 12/20/2024 0.1  0.0 - 0.1 x10E3/uL Final         Review of Systems   Constitutional: Negative.    HENT: Negative.     Respiratory: Negative.     Cardiovascular:  Positive for chest pain.   Gastrointestinal: Negative.  Positive for GERD.   Genitourinary: Negative.    Musculoskeletal: Negative.    Skin: Negative.    Neurological: Negative.    Psychiatric/Behavioral: Negative.         Objective   Physical Exam  Constitutional:       Appearance: Normal appearance.   HENT:      Head: Normocephalic.   Cardiovascular:      Rate and Rhythm: Normal rate and regular rhythm.      Pulses: Normal pulses.      Heart sounds: Normal heart sounds.   Pulmonary:      Effort: Pulmonary effort is normal.      Breath sounds: Normal breath sounds.   Abdominal:      General: Bowel sounds are normal.   Musculoskeletal:         General: Normal range of motion.   Skin:     General: Skin is warm.   Neurological:      General: No focal deficit present.      Mental Status: He is alert and oriented to  person, place, and time.   Psychiatric:         Mood and Affect: Mood normal.         Behavior: Behavior normal.         Procedures    Assessment & Plan   Diagnoses and all orders for this visit:    1. Tobacco abuse (Primary)  -      CT Chest Low Dose Cancer Screening WO; Future    2. Screening for lung cancer  -      CT Chest Low Dose Cancer Screening WO; Future    Other orders  -     albuterol sulfate HFA (Ventolin HFA) 108 (90 Base) MCG/ACT inhaler; Inhale 2 puffs Every 4 (Four) Hours As Needed for Wheezing.  Dispense: 18 g; Refill: 6  -     atenolol (TENORMIN) 50 MG tablet; Take 1 tablet by mouth Daily.  Dispense: 90 tablet; Refill: 1  -     hydrOXYzine (ATARAX) 50 MG tablet; Take 1 tablet by mouth Every Night. 1-2 tabs every night as needed for insomnia  Dispense: 60 tablet; Refill: 2  -     pantoprazole (PROTONIX) 20 MG EC tablet; Take 1 tablet by mouth Daily.  Dispense: 180 tablet; Refill: 1  -     sucralfate (Carafate) 1 g tablet; 1tab 1-4 x a day as needed for reflux  Dispense: 120 tablet; Refill: 1           Current Outpatient Medications:     albuterol sulfate HFA (Ventolin HFA) 108 (90 Base) MCG/ACT inhaler, Inhale 2 puffs Every 4 (Four) Hours As Needed for Wheezing., Disp: 18 g, Rfl: 6    aspirin 81 MG EC tablet, Take 1 tablet by mouth Daily., Disp: 100 tablet, Rfl: 1    atenolol (TENORMIN) 50 MG tablet, Take 1 tablet by mouth Daily., Disp: 90 tablet, Rfl: 1    cetirizine (zyrTEC) 10 MG tablet, TAKE 1 TABLETS BY MOUTH EVERY DAY, Disp: 30 tablet, Rfl: 2    docusate sodium (Colace) 100 MG capsule, Take 1 capsule by mouth 2 (Two) Times a Day., Disp: 180 capsule, Rfl: 2    hydrocortisone (ANUSOL-HC) 25 MG suppository, Insert 1 suppository into the rectum 2 (Two) Times a Day., Disp: 24 each, Rfl: 6    hydrOXYzine (ATARAX) 50 MG tablet, Take 1 tablet by mouth Every Night. 1-2 tabs every night as needed for insomnia, Disp: 60 tablet, Rfl: 2    nitroglycerin (Nitrostat) 0.4 MG SL tablet, Place 1 tablet under  the tongue Every 5 (Five) Minutes As Needed for Chest Pain. Take no more than 3 doses in 15 minutes., Disp: 30 tablet, Rfl: 12    pantoprazole (PROTONIX) 20 MG EC tablet, Take 1 tablet by mouth Daily., Disp: 180 tablet, Rfl: 1    phenylephrine 0.25 % suppository, Insert 1 suppository into the rectum 2 (Two) Times a Day., Disp: 60 suppository, Rfl: 3    sucralfate (Carafate) 1 g tablet, 1tab 1-4 x a day as needed for reflux, Disp: 120 tablet, Rfl: 1    Triamcinolone Acetonide (Nasacort Allergy 24HR) 55 MCG/ACT nasal inhaler, 2 sprays into the nostril(s) as directed by provider Daily., Disp: 16.9 mL, Rfl: 6           MARGARITA Jacobson 3/18/2025 12:00 EDT  This note has been electronically signed

## 2025-03-18 NOTE — PATIENT INSTRUCTIONS
CT chest low-dose  Monitor blood pressure at home and let me know if it does not return to normal  Schedule eye exam  Keep appointment with Dr. Tovar  Try taking nitroglycerin with chest pain episodes  Follow-up 3 to 6 months

## 2025-04-07 ENCOUNTER — OFFICE VISIT (OUTPATIENT)
Dept: CARDIOLOGY | Facility: CLINIC | Age: 50
End: 2025-04-07
Payer: COMMERCIAL

## 2025-04-07 VITALS
HEIGHT: 72 IN | SYSTOLIC BLOOD PRESSURE: 156 MMHG | WEIGHT: 157 LBS | HEART RATE: 62 BPM | DIASTOLIC BLOOD PRESSURE: 82 MMHG | OXYGEN SATURATION: 98 % | BODY MASS INDEX: 21.26 KG/M2

## 2025-04-07 DIAGNOSIS — I10 PRIMARY HYPERTENSION: Primary | ICD-10-CM

## 2025-04-07 DIAGNOSIS — R07.89 OTHER CHEST PAIN: ICD-10-CM

## 2025-04-07 DIAGNOSIS — R06.02 SHORTNESS OF BREATH: ICD-10-CM

## 2025-04-07 PROCEDURE — 99214 OFFICE O/P EST MOD 30 MIN: CPT | Performed by: INTERNAL MEDICINE

## 2025-04-07 RX ORDER — LOSARTAN POTASSIUM 25 MG/1
25 TABLET ORAL 2 TIMES DAILY
Qty: 180 TABLET | Refills: 1 | Status: SHIPPED | OUTPATIENT
Start: 2025-04-07

## 2025-04-07 NOTE — PROGRESS NOTES
Date of Office Visit: 2025  Encounter Provider: Samir Tovar MD  Place of Service: [facility] [place]  Patient Name: Tino Mcgrath  :1975  Tessa Tate APRN    Chief Complaint   Patient presents with    Follow-up     1.5 yr f/u     History of Present Illness  I am pleased to see Mr. Mcgrath in my office today as a follow-up.    As you know, patient is 48-year-old white gentleman whose past medical history is significant for hypertension, tobacco abuse, COPD, who came today for follow-up.    Patient reports that he is having symptom of chest discomfort described as a tightness and heaviness across the chest.  It is precipitated with exertion and relieved with rest.  Patient also complain of shortness of breath.  Patient denies any orthopnea PND no leg edema noted.  Patient does complain of palpitation occasionally with exertion.  No syncope or presyncope.     Patient smokes a pack and a half of cigarettes per day.  He does not abuse alcohol.  Patient has family history for CAD in his father  of coronary artery disease    In , patient underwent stress test which was negative for ischemia or myocardial infarction.  Patient underwent echocardiogram which showed normal left trickle size and function and no significant valvular heart disease noted.  LVEF was 55 to 60%.    Patient came today in his blood pressure is noted to be elevated.  It is running in the range of 150 to 178 mm systolic.  Patient occasionally complain of chest tightness.  Patient has baseline shortness of breath and uses inhalers which helps him.  Patient denies any orthopnea PND no leg edema noted.    I would start losartan 25 mg twice daily patient is advised to monitor the blood pressure.  I will see the patient in 6 months with blood pressure reading if patient continues to have chest tightness patient may need cardiac catheterization.          Past Medical History:   Diagnosis Date    GERD  (gastroesophageal reflux disease)     Hypertension          Past Surgical History:   Procedure Laterality Date    CARPAL TUNNEL RELEASE Right     ELBOW PROCEDURE Right            Current Outpatient Medications:     albuterol sulfate HFA (Ventolin HFA) 108 (90 Base) MCG/ACT inhaler, Inhale 2 puffs Every 4 (Four) Hours As Needed for Wheezing., Disp: 18 g, Rfl: 6    aspirin 81 MG EC tablet, Take 1 tablet by mouth Daily., Disp: 100 tablet, Rfl: 1    atenolol (TENORMIN) 50 MG tablet, Take 1 tablet by mouth Daily., Disp: 90 tablet, Rfl: 1    cetirizine (zyrTEC) 10 MG tablet, TAKE 1 TABLETS BY MOUTH EVERY DAY, Disp: 30 tablet, Rfl: 2    docusate sodium (Colace) 100 MG capsule, Take 1 capsule by mouth 2 (Two) Times a Day., Disp: 180 capsule, Rfl: 2    hydrocortisone (ANUSOL-HC) 25 MG suppository, Insert 1 suppository into the rectum 2 (Two) Times a Day., Disp: 24 each, Rfl: 6    hydrOXYzine (ATARAX) 50 MG tablet, Take 1 tablet by mouth Every Night. 1-2 tabs every night as needed for insomnia, Disp: 60 tablet, Rfl: 2    nitroglycerin (Nitrostat) 0.4 MG SL tablet, Place 1 tablet under the tongue Every 5 (Five) Minutes As Needed for Chest Pain. Take no more than 3 doses in 15 minutes., Disp: 30 tablet, Rfl: 12    pantoprazole (PROTONIX) 20 MG EC tablet, Take 1 tablet by mouth Daily., Disp: 180 tablet, Rfl: 1    phenylephrine 0.25 % suppository, Insert 1 suppository into the rectum 2 (Two) Times a Day., Disp: 60 suppository, Rfl: 3    sucralfate (Carafate) 1 g tablet, 1tab 1-4 x a day as needed for reflux, Disp: 120 tablet, Rfl: 1    Triamcinolone Acetonide (Nasacort Allergy 24HR) 55 MCG/ACT nasal inhaler, 2 sprays into the nostril(s) as directed by provider Daily., Disp: 16.9 mL, Rfl: 6    losartan (Cozaar) 25 MG tablet, Take 1 tablet by mouth 2 (Two) Times a Day., Disp: 180 tablet, Rfl: 1      Social History     Socioeconomic History    Marital status:    Tobacco Use    Smoking status: Every Day     Current packs/day:  "1.50     Average packs/day: 1.5 packs/day for 36.3 years (54.4 ttl pk-yrs)     Types: Cigarettes     Start date: 1989     Passive exposure: Current    Smokeless tobacco: Never    Tobacco comments:     Patient advised to stop smoking   Vaping Use    Vaping status: Never Used   Substance and Sexual Activity    Alcohol use: Yes     Alcohol/week: 6.0 - 12.0 standard drinks of alcohol     Types: 6 - 12 Cans of beer per week    Drug use: Yes     Types: Marijuana    Sexual activity: Defer         Review of Systems   Constitutional: Negative for chills and fever.   HENT:  Negative for ear discharge and nosebleeds.    Eyes:  Negative for discharge and redness.   Cardiovascular:  Positive for chest pain. Negative for orthopnea, palpitations, paroxysmal nocturnal dyspnea and syncope.   Respiratory:  Positive for shortness of breath. Negative for cough and wheezing.    Endocrine: Negative for heat intolerance.   Skin:  Negative for rash.   Musculoskeletal:  Negative for arthritis and myalgias.   Gastrointestinal:  Negative for abdominal pain, melena, nausea and vomiting.   Genitourinary:  Negative for dysuria and hematuria.   Neurological:  Negative for dizziness, light-headedness, numbness and tremors.   Psychiatric/Behavioral:  Negative for depression. The patient is not nervous/anxious.        Procedures    Procedures    No orders to display           Objective:    /82 (BP Location: Left arm, Patient Position: Sitting, Cuff Size: Adult)   Pulse 62   Ht 182.9 cm (72\")   Wt 71.2 kg (157 lb)   SpO2 98%   BMI 21.29 kg/m²         Constitutional:       Appearance: Well-developed.   Eyes:      General: No scleral icterus.        Right eye: No discharge.   HENT:      Head: Normocephalic and atraumatic.   Neck:      Thyroid: No thyromegaly.      Lymphadenopathy: No cervical adenopathy.   Pulmonary:      Effort: Pulmonary effort is normal. No respiratory distress.      Breath sounds: Normal breath sounds. No wheezing. No " rales.   Cardiovascular:      Normal rate. Regular rhythm.      No gallop.    Edema:     Peripheral edema absent.   Abdominal:      Tenderness: There is no abdominal tenderness.   Skin:     Findings: No erythema or rash.   Neurological:      Mental Status: Alert and oriented to person, place, and time.             Assessment:       Diagnosis Plan   1. Primary hypertension  losartan (Cozaar) 25 MG tablet      2. Other chest pain  losartan (Cozaar) 25 MG tablet      3. Shortness of breath  losartan (Cozaar) 25 MG tablet               Plan:       MDM:    1.  Shortness of breath:    I suspect it is underlying COPD patient continues to smoke metered-dose inhaler helped the patient.  Current treatment would be continued    Recent echocardiogram is unremarkable.  Stress test was not negative    2.  Chest pain:    Patient continues to complain of chest tightness.  Stress test was negative recently if patient continues to have symptoms I will consider cardiac catheterization    3.  Hypertension:    I have started losartan 25 mg twice daily

## 2025-07-21 RX ORDER — CETIRIZINE HYDROCHLORIDE 10 MG/1
10 TABLET ORAL DAILY
Qty: 30 TABLET | Refills: 0 | Status: SHIPPED | OUTPATIENT
Start: 2025-07-21

## 2025-08-25 RX ORDER — CETIRIZINE HYDROCHLORIDE 10 MG/1
10 TABLET ORAL DAILY
Qty: 30 TABLET | Refills: 0 | OUTPATIENT
Start: 2025-08-25